# Patient Record
Sex: MALE | Race: WHITE | NOT HISPANIC OR LATINO | URBAN - METROPOLITAN AREA
[De-identification: names, ages, dates, MRNs, and addresses within clinical notes are randomized per-mention and may not be internally consistent; named-entity substitution may affect disease eponyms.]

---

## 2018-08-07 ENCOUNTER — RECORDS - HEALTHEAST (OUTPATIENT)
Dept: LAB | Facility: CLINIC | Age: 78
End: 2018-08-07

## 2018-08-07 LAB
ALBUMIN SERPL-MCNC: 3.6 G/DL (ref 3.5–5)
ALP SERPL-CCNC: 88 U/L (ref 45–120)
ALT SERPL W P-5'-P-CCNC: 18 U/L (ref 0–45)
ANION GAP SERPL CALCULATED.3IONS-SCNC: 8 MMOL/L (ref 5–18)
AST SERPL W P-5'-P-CCNC: 15 U/L (ref 0–40)
BILIRUB SERPL-MCNC: 0.3 MG/DL (ref 0–1)
BUN SERPL-MCNC: 29 MG/DL (ref 8–28)
CALCIUM SERPL-MCNC: 9.7 MG/DL (ref 8.5–10.5)
CHLORIDE BLD-SCNC: 106 MMOL/L (ref 98–107)
CHOLEST SERPL-MCNC: 172 MG/DL
CO2 SERPL-SCNC: 25 MMOL/L (ref 22–31)
CREAT SERPL-MCNC: 1.12 MG/DL (ref 0.7–1.3)
FASTING STATUS PATIENT QL REPORTED: NORMAL
GFR SERPL CREATININE-BSD FRML MDRD: >60 ML/MIN/1.73M2
GLUCOSE BLD-MCNC: 128 MG/DL (ref 70–125)
HDLC SERPL-MCNC: 73 MG/DL
LDLC SERPL CALC-MCNC: 82 MG/DL
POTASSIUM BLD-SCNC: 4.8 MMOL/L (ref 3.5–5)
PROT SERPL-MCNC: 6.6 G/DL (ref 6–8)
SODIUM SERPL-SCNC: 139 MMOL/L (ref 136–145)
TRIGL SERPL-MCNC: 83 MG/DL

## 2019-11-04 ENCOUNTER — RECORDS - HEALTHEAST (OUTPATIENT)
Dept: LAB | Facility: CLINIC | Age: 79
End: 2019-11-04

## 2019-11-04 LAB
ALBUMIN SERPL-MCNC: 3.1 G/DL (ref 3.5–5)
ALP SERPL-CCNC: 123 U/L (ref 45–120)
ALT SERPL W P-5'-P-CCNC: 19 U/L (ref 0–45)
ANION GAP SERPL CALCULATED.3IONS-SCNC: 7 MMOL/L (ref 5–18)
AST SERPL W P-5'-P-CCNC: 20 U/L (ref 0–40)
BILIRUB SERPL-MCNC: 0.3 MG/DL (ref 0–1)
BUN SERPL-MCNC: 25 MG/DL (ref 8–28)
CALCIUM SERPL-MCNC: 9.3 MG/DL (ref 8.5–10.5)
CHLORIDE BLD-SCNC: 107 MMOL/L (ref 98–107)
CO2 SERPL-SCNC: 27 MMOL/L (ref 22–31)
CREAT SERPL-MCNC: 1.05 MG/DL (ref 0.7–1.3)
GFR SERPL CREATININE-BSD FRML MDRD: >60 ML/MIN/1.73M2
GLUCOSE BLD-MCNC: 130 MG/DL (ref 70–125)
POTASSIUM BLD-SCNC: 4.6 MMOL/L (ref 3.5–5)
PROT SERPL-MCNC: 6 G/DL (ref 6–8)
SODIUM SERPL-SCNC: 141 MMOL/L (ref 136–145)

## 2019-11-05 LAB
CHOLEST SERPL-MCNC: 172 MG/DL
FASTING STATUS PATIENT QL REPORTED: NORMAL
HDLC SERPL-MCNC: 59 MG/DL
LDLC SERPL CALC-MCNC: 98 MG/DL
TRIGL SERPL-MCNC: 75 MG/DL

## 2019-12-18 ENCOUNTER — RECORDS - HEALTHEAST (OUTPATIENT)
Dept: LAB | Facility: CLINIC | Age: 79
End: 2019-12-18

## 2019-12-18 LAB
ANION GAP SERPL CALCULATED.3IONS-SCNC: 5 MMOL/L (ref 5–18)
BUN SERPL-MCNC: 28 MG/DL (ref 8–28)
CALCIUM SERPL-MCNC: 9.6 MG/DL (ref 8.5–10.5)
CHLORIDE BLD-SCNC: 105 MMOL/L (ref 98–107)
CO2 SERPL-SCNC: 29 MMOL/L (ref 22–31)
CREAT SERPL-MCNC: 1.32 MG/DL (ref 0.7–1.3)
GFR SERPL CREATININE-BSD FRML MDRD: 52 ML/MIN/1.73M2
GLUCOSE BLD-MCNC: 135 MG/DL (ref 70–125)
POTASSIUM BLD-SCNC: 4.5 MMOL/L (ref 3.5–5)
SODIUM SERPL-SCNC: 139 MMOL/L (ref 136–145)

## 2020-04-22 ENCOUNTER — RECORDS - HEALTHEAST (OUTPATIENT)
Dept: LAB | Facility: CLINIC | Age: 80
End: 2020-04-22

## 2020-04-22 LAB
ALBUMIN SERPL-MCNC: 3.6 G/DL (ref 3.5–5)
ALP SERPL-CCNC: 93 U/L (ref 45–120)
ALT SERPL W P-5'-P-CCNC: 10 U/L (ref 0–45)
ANION GAP SERPL CALCULATED.3IONS-SCNC: 10 MMOL/L (ref 5–18)
AST SERPL W P-5'-P-CCNC: 15 U/L (ref 0–40)
BILIRUB SERPL-MCNC: 0.4 MG/DL (ref 0–1)
BUN SERPL-MCNC: 20 MG/DL (ref 8–28)
CALCIUM SERPL-MCNC: 9.5 MG/DL (ref 8.5–10.5)
CHLORIDE BLD-SCNC: 104 MMOL/L (ref 98–107)
CO2 SERPL-SCNC: 24 MMOL/L (ref 22–31)
CREAT SERPL-MCNC: 1.19 MG/DL (ref 0.7–1.3)
GFR SERPL CREATININE-BSD FRML MDRD: 59 ML/MIN/1.73M2
GLUCOSE BLD-MCNC: 125 MG/DL (ref 70–125)
POTASSIUM BLD-SCNC: 4.6 MMOL/L (ref 3.5–5)
PROT SERPL-MCNC: 6.8 G/DL (ref 6–8)
SODIUM SERPL-SCNC: 138 MMOL/L (ref 136–145)

## 2020-05-13 ENCOUNTER — RECORDS - HEALTHEAST (OUTPATIENT)
Dept: LAB | Facility: CLINIC | Age: 80
End: 2020-05-13

## 2020-05-13 LAB
ALBUMIN SERPL-MCNC: 3.7 G/DL (ref 3.5–5)
ALP SERPL-CCNC: 102 U/L (ref 45–120)
ALT SERPL W P-5'-P-CCNC: 18 U/L (ref 0–45)
ANION GAP SERPL CALCULATED.3IONS-SCNC: 8 MMOL/L (ref 5–18)
AST SERPL W P-5'-P-CCNC: 14 U/L (ref 0–40)
BILIRUB SERPL-MCNC: 0.2 MG/DL (ref 0–1)
BUN SERPL-MCNC: 36 MG/DL (ref 8–28)
CALCIUM SERPL-MCNC: 9.7 MG/DL (ref 8.5–10.5)
CHLORIDE BLD-SCNC: 107 MMOL/L (ref 98–107)
CO2 SERPL-SCNC: 24 MMOL/L (ref 22–31)
CREAT SERPL-MCNC: 1.5 MG/DL (ref 0.7–1.3)
GFR SERPL CREATININE-BSD FRML MDRD: 45 ML/MIN/1.73M2
GLUCOSE BLD-MCNC: 106 MG/DL (ref 70–125)
POTASSIUM BLD-SCNC: 5.7 MMOL/L (ref 3.5–5)
PROT SERPL-MCNC: 7.2 G/DL (ref 6–8)
SODIUM SERPL-SCNC: 139 MMOL/L (ref 136–145)

## 2020-05-20 ENCOUNTER — RECORDS - HEALTHEAST (OUTPATIENT)
Dept: LAB | Facility: CLINIC | Age: 80
End: 2020-05-20

## 2020-05-20 LAB
ALBUMIN SERPL-MCNC: 4 G/DL (ref 3.5–5)
ALP SERPL-CCNC: 98 U/L (ref 45–120)
ALT SERPL W P-5'-P-CCNC: 11 U/L (ref 0–45)
ANION GAP SERPL CALCULATED.3IONS-SCNC: 13 MMOL/L (ref 5–18)
AST SERPL W P-5'-P-CCNC: 14 U/L (ref 0–40)
BILIRUB SERPL-MCNC: 0.4 MG/DL (ref 0–1)
BUN SERPL-MCNC: 38 MG/DL (ref 8–28)
CALCIUM SERPL-MCNC: 10 MG/DL (ref 8.5–10.5)
CHLORIDE BLD-SCNC: 103 MMOL/L (ref 98–107)
CO2 SERPL-SCNC: 24 MMOL/L (ref 22–31)
CREAT SERPL-MCNC: 1.5 MG/DL (ref 0.7–1.3)
GFR SERPL CREATININE-BSD FRML MDRD: 45 ML/MIN/1.73M2
GLUCOSE BLD-MCNC: 154 MG/DL (ref 70–125)
POTASSIUM BLD-SCNC: 4.7 MMOL/L (ref 3.5–5)
PROT SERPL-MCNC: 7.9 G/DL (ref 6–8)
SODIUM SERPL-SCNC: 140 MMOL/L (ref 136–145)

## 2020-09-03 ENCOUNTER — COMMUNICATION - HEALTHEAST (OUTPATIENT)
Dept: SCHEDULING | Facility: CLINIC | Age: 80
End: 2020-09-03

## 2020-09-09 ENCOUNTER — OFFICE VISIT - HEALTHEAST (OUTPATIENT)
Dept: GERIATRICS | Facility: CLINIC | Age: 80
End: 2020-09-09

## 2020-09-09 DIAGNOSIS — I10 ESSENTIAL HYPERTENSION: ICD-10-CM

## 2020-09-09 DIAGNOSIS — G93.40 ACUTE ENCEPHALOPATHY: ICD-10-CM

## 2020-09-09 DIAGNOSIS — M25.559 HIP PAIN: ICD-10-CM

## 2020-09-09 DIAGNOSIS — F03.90 DEMENTIA WITHOUT BEHAVIORAL DISTURBANCE, UNSPECIFIED DEMENTIA TYPE: ICD-10-CM
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
## 2020-09-09 DIAGNOSIS — Z86.73 HISTORY OF STROKE: ICD-10-CM

## 2020-09-09 DIAGNOSIS — N40.1 BENIGN PROSTATIC HYPERPLASIA WITH LOWER URINARY TRACT SYMPTOMS, SYMPTOM DETAILS UNSPECIFIED: ICD-10-CM

## 2020-09-09 DIAGNOSIS — R33.9 URINARY RETENTION: ICD-10-CM

## 2020-09-11 ENCOUNTER — OFFICE VISIT - HEALTHEAST (OUTPATIENT)
Dept: GERIATRICS | Facility: CLINIC | Age: 80
End: 2020-09-11

## 2020-09-11 DIAGNOSIS — R46.89 COGNITIVE AND BEHAVIORAL CHANGES: ICD-10-CM

## 2020-09-11 DIAGNOSIS — R41.89 COGNITIVE AND BEHAVIORAL CHANGES: ICD-10-CM

## 2020-09-11 DIAGNOSIS — M25.559 HIP PAIN: ICD-10-CM
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
## 2020-09-11 DIAGNOSIS — I10 HYPERTENSION, UNSPECIFIED TYPE: ICD-10-CM

## 2020-09-14 ENCOUNTER — OFFICE VISIT - HEALTHEAST (OUTPATIENT)
Dept: GERIATRICS | Facility: CLINIC | Age: 80
End: 2020-09-14

## 2020-09-14 DIAGNOSIS — G47.00 INSOMNIA, UNSPECIFIED TYPE: ICD-10-CM

## 2020-09-14 DIAGNOSIS — M25.559 HIP PAIN: ICD-10-CM

## 2020-09-14 DIAGNOSIS — I10 HYPERTENSION, UNSPECIFIED TYPE: ICD-10-CM
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
## 2020-09-16 ENCOUNTER — AMBULATORY - HEALTHEAST (OUTPATIENT)
Dept: GERIATRICS | Facility: CLINIC | Age: 80
End: 2020-09-16

## 2020-12-19 ENCOUNTER — COMMUNICATION - HEALTHEAST (OUTPATIENT)
Dept: SCHEDULING | Facility: CLINIC | Age: 80
End: 2020-12-19

## 2021-01-06 ENCOUNTER — RECORDS - HEALTHEAST (OUTPATIENT)
Dept: LAB | Facility: CLINIC | Age: 81
End: 2021-01-06

## 2021-01-07 LAB — BACTERIA SPEC CULT: NO GROWTH

## 2021-04-21 ENCOUNTER — RECORDS - HEALTHEAST (OUTPATIENT)
Dept: LAB | Facility: CLINIC | Age: 81
End: 2021-04-21

## 2021-04-23 LAB — BACTERIA SPEC CULT: ABNORMAL

## 2021-05-13 ENCOUNTER — COMMUNICATION - HEALTHEAST (OUTPATIENT)
Dept: SCHEDULING | Facility: CLINIC | Age: 81
End: 2021-05-13

## 2021-05-27 VITALS
HEART RATE: 69 BPM | SYSTOLIC BLOOD PRESSURE: 151 MMHG | DIASTOLIC BLOOD PRESSURE: 69 MMHG | OXYGEN SATURATION: 96 % | TEMPERATURE: 97.1 F | RESPIRATION RATE: 14 BRPM

## 2021-06-02 ENCOUNTER — TRANSCRIBE ORDERS (OUTPATIENT)
Dept: OTHER | Age: 81
End: 2021-06-02

## 2021-06-02 DIAGNOSIS — F03.918 DEMENTIA WITH BEHAVIORAL DISTURBANCE (H): Primary | ICD-10-CM

## 2021-06-11 NOTE — PROGRESS NOTES
[facility]    Facility:   [facility] [[id number]]   Code Status: DNR      CHIEF COMPLAINT/REASON FOR VISIT:  Chief Complaint   Patient presents with     Problem Visit     [facility] fall, rehab, hip, htn, bph,        HISTORY:      HPI: Dony is a 80 y.o. male who was hospitalized September 1, 2020 through September 8, 2020 secondary to a fall.  Right hip x-rays did show anatomic alignment of the right hip and no acute displaced fracture.  He does have mild bilateral hip osteoarthritis along with degenerative changes at the symphysis pubis and symmetric degenerative SI joints.  The CT of the pelvis did not show any evidence of avascular necrosis and also again confirms that there are degenerative changes in both hips.  He was diagnosed with right and left hip pain but negative for fractures and was given oxycodone and Tylenol.  They did feel it was a mechanical fall.  EKG was negative for any ischemic changes and the troponins x3 were negative.  He also has a history of hypertension and diabetes along with cognitive impairment and BPH.  Initially he did have a Auguste catheter.  He is now in the transitional care unit and due to cognition issues he has had occasional behavioral problems as well as forgetfulness but at times he has been redirectable.  In talking with the  they are looking at a potential for discharge the next couple days maybe even perhaps by the weekend.  He has been normotensive and afebrile and also on room air.  For pain he does take the oxycodone as needed and he does take about 2-3 doses per day and takes between 1 or 2 Tylenol as needed per day.  Blood sugars have been checked due to history of diabetes but currently not being treated.  Sugars ranging 123-130.    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             Family History   Problem Relation Age of Onset     Ovarian cancer Mother      No Medical Problems Father      Stroke Sister      Social History     Socioeconomic History     Marital status:      Spouse name: Not on file     Number of children: Not on file     Years of education: Not on file     Highest education level: Not on file   Occupational History     Not on file   Social Needs     Financial resource strain: Not on file     Food insecurity     Worry: Not on file     Inability: Not on file     Transportation needs     Medical: Not on file     Non-medical: Not on file   Tobacco Use     Smoking status: Current Every Day Smoker     Packs/day: 0.25     Years: 70.00     Pack years: 17.50     Types: Cigarettes     Smokeless tobacco: Never Used   Substance and Sexual Activity     Alcohol use: Not Currently     Drug use: Yes     Types: Marijuana     Comment: 1 ounce/ 3 weeks     Sexual activity: Not Currently   Lifestyle     Physical activity     Days per week: Not on file     Minutes per session: Not on file     Stress: Not on file   Relationships     Social connections     Talks on phone: Not on file     Gets together: Not on file     Attends Nondenominational service: Not on file     Active member of club or organization: Not on file     Attends meetings of clubs or organizations: Not on file     Relationship status: Not on file     Intimate partner violence     Fear of current or ex partner: Not on file     Emotionally abused: Not on file     Physically abused: Not on file     Forced sexual activity: Not on file   Other Topics Concern     Not on file   Social History Narrative    Patient does not use any assistive device for ambulation. Patient is not on any supplemental O2 at home. Patient's wife jared is HCP.          Review of Systems  There have been no reports of fever chills fatigue cough or cold or flulike symptoms nausea vomiting diarrhea dysuria headache  stiff neck appetite changes or unusual myalgias.  There were no vitals filed for this visit.  Blood pressure 109/53, pulse 63, respirations 20, temperature 98.3, saturation room air 96%  Physical Exam  Head is normocephalic.  Neck is supple without adenopathy.  Lung sounds are clear throughout.  Cardiovascular S1-S2 regular rhythm.  No lower extremity edema.  Gastrointestinal nontender and nondistended.  Musculoskeletal denies any hip.  Psychiatric: Flat affect.  LABS:   Lab Results   Component Value Date    HGBA1C 6.4 (H) 09/01/2020     Lab Results   Component Value Date    WBC 7.8 09/04/2020    HGB 11.9 (L) 09/04/2020    HCT 35.1 (L) 09/04/2020    MCV 97 09/04/2020     09/04/2020     Results for orders placed or performed during the hospital encounter of 09/01/20   Basic Metabolic Panel   Result Value Ref Range    Sodium 139 136 - 145 mmol/L    Potassium 4.2 3.5 - 5.0 mmol/L    Chloride 107 98 - 107 mmol/L    CO2 25 22 - 31 mmol/L    Anion Gap, Calculation 7 5 - 18 mmol/L    Glucose 112 70 - 125 mg/dL    Calcium 9.4 8.5 - 10.5 mg/dL    BUN 26 8 - 28 mg/dL    Creatinine 0.98 0.70 - 1.30 mg/dL    GFR MDRD Af Amer >60 >60 mL/min/1.73m2    GFR MDRD Non Af Amer >60 >60 mL/min/1.73m2       Lab Results   Component Value Date    ALT 17 09/01/2020    AST 15 09/01/2020    ALKPHOS 81 09/01/2020    BILITOT 0.5 09/01/2020     .  Lab Results   Component Value Date    ALBUMIN 2.8 (L) 09/01/2020         ASSESSMENT:      ICD-10-CM    1. Accident due to mechanical fall without injury, subsequent encounter  W19.XXXD    2. Hip pain  M25.559    3. Hypertension, unspecified type  I10    4. Cognitive and behavioral changes  R41.89     R46.89        PLAN:    Continue to manage his chronic medical conditions.  It appears that he will potentially have a discharge in the next day or 2 but that has not been formally determined.  If he does get discharged we will send him home with current medications.  Otherwise continue to work with  him in therapy and continue to manage and follow.    For documentation purposes total visit 35 minutes to which over 50% was spent with the patient to establish care along with his medications and his hospitalization and his concerns as well as coordination of care with other staff members and .      Electronically signed by: Michael Duane Johnson, CNP

## 2021-06-11 NOTE — PROGRESS NOTES
[facility]    Facility[facility]   [facility] [[id number]]   Code Status: DNR      CHIEF COMPLAINT/REASON FOR VISIT:  Chief Complaint   Patient presents with     Problem Visit     hip pain, hayes,        HISTORY:      HPI: Dony is a 80 y.o. male who is in the transitional care unit and was hospitalized September 1, 2020 through September 8, 2020 secondary to a fall and did sustain right hip pain but does have osteoarthrosis through his major joints but no fractures.  He also has a Hayes catheter.  Has been enrolled in the therapy process and has occasionally participated.  He has been normotensive and afebrile and also on room air.  For pain he can take Tylenol as needed usually about 1 dose per day also does have oxycodone as needed usually.  He is on a level 4 diet.  I did have a chance to talk to the  about his desire to want to go home.      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             Family History   Problem Relation Age of Onset     Ovarian cancer Mother      No Medical Problems Father      Stroke Sister      Social History     Socioeconomic History     Marital status:      Spouse name: Not on file     Number of children: Not on file     Years of education: Not on file     Highest education level: Not on file   Occupational History     Not on file   Social Needs     Financial resource strain: Not on file     Food insecurity     Worry: Not on file     Inability: Not on file     Transportation needs     Medical: Not on file     Non-medical: Not on file   Tobacco Use     Smoking status: Current Every Day Smoker     Packs/day: 0.25     Years: 70.00     Pack years: 17.50     Types: Cigarettes     Smokeless tobacco: Never Used   Substance and Sexual Activity     Alcohol use: Not  Currently     Drug use: Yes     Types: Marijuana     Comment: 1 ounce/ 3 weeks     Sexual activity: Not Currently   Lifestyle     Physical activity     Days per week: Not on file     Minutes per session: Not on file     Stress: Not on file   Relationships     Social connections     Talks on phone: Not on file     Gets together: Not on file     Attends Advent service: Not on file     Active member of club or organization: Not on file     Attends meetings of clubs or organizations: Not on file     Relationship status: Not on file     Intimate partner violence     Fear of current or ex partner: Not on file     Emotionally abused: Not on file     Physically abused: Not on file     Forced sexual activity: Not on file   Other Topics Concern     Not on file   Social History Narrative    Patient does not use any assistive device for ambulation. Patient is not on any supplemental O2 at home. Patient's wife jared is HCP.          Review of Systems  There have been no reports of fever chills fatigue cough or cold or flulike symptoms nausea vomiting diarrhea dysuria headache stiff neck appetite changes or unusual myalgias.    Current Outpatient Medications   Medication Sig Note     acetaminophen (TYLENOL) 500 MG tablet Take 1 tablet (500 mg total) by mouth every 4 (four) hours as needed for pain.      amitriptyline (ELAVIL) 25 MG tablet Take 3 tablets (75 mg total) by mouth at bedtime.      divalproex (DEPAKOTE SPRINKLE) 125 mg capsule Take 1 capsule (125 mg total) by mouth at bedtime.      divalproex (DEPAKOTE SPRINKLE) 125 mg capsule Take 1 capsule (125 mg total) by mouth daily before supper. 9/1/2020: Based on conversation with patient & [facility] medical records, suspect patient is supposed to be taking Depakote two times a day at supper and bedtime, but is only taking 1 capsule (125 mg) nightly at bedtime.     finasteride (PROSCAR) 5 mg tablet Take 5 mg by mouth at bedtime.       lisinopriL (PRINIVIL,ZESTRIL) 20 MG  tablet Take 20 mg by mouth at bedtime.       nebivoloL 20 mg Tab Take 20 mg by mouth at bedtime.       oxyCODONE (ROXICODONE) 5 MG immediate release tablet Take 0.5 tablets (2.5 mg total) by mouth every 8 (eight) hours as needed.      polyethylene glycol (MIRALAX) 17 gram packet Take 1 packet (17 g total) by mouth daily. Hold if loose stool or more than 2 BMs per day      terazosin (HYTRIN) 5 MG capsule Take 5 mg by mouth at bedtime.        There were no vitals filed for this visit.  Blood pressure 131/72, pulse 62, respirations 16, temperature 97.8  Physical Exam   Head is normocephalic.   Lung sounds are clear throughout.  Cardiovascular S1-S2 regular rhythm.  No lower extremity edema.  Gastrointestinal nontender and nondistended.  Musculoskeletal denies any hip.  Psychiatric: Flat affect.   Auguste catheter  LABS:   Lab Results   Component Value Date    WBC 7.8 09/04/2020    HGB 11.9 (L) 09/04/2020    HCT 35.1 (L) 09/04/2020    MCV 97 09/04/2020     09/04/2020     Results for orders placed or performed during the hospital encounter of 09/01/20   Basic Metabolic Panel   Result Value Ref Range    Sodium 139 136 - 145 mmol/L    Potassium 4.2 3.5 - 5.0 mmol/L    Chloride 107 98 - 107 mmol/L    CO2 25 22 - 31 mmol/L    Anion Gap, Calculation 7 5 - 18 mmol/L    Glucose 112 70 - 125 mg/dL    Calcium 9.4 8.5 - 10.5 mg/dL    BUN 26 8 - 28 mg/dL    Creatinine 0.98 0.70 - 1.30 mg/dL    GFR MDRD Af Amer >60 >60 mL/min/1.73m2    GFR MDRD Non Af Amer >60 >60 mL/min/1.73m2       Lab Results   Component Value Date    HGBA1C 6.4 (H) 09/01/2020         ASSESSMENT:      ICD-10-CM    1. Hip pain  M25.559    2. Fall, subsequent encounter  W19.XXXD    3. Hypertension, unspecified type  I10    4. Insomnia, unspecified type  G47.00        PLAN:    At this point did have a meeting with the  regarding his desire to be discharged to home I do not know all the legitimate etiologies or the concerns with that suggestion but  the  will check into it today.  I will is no other new medication changes with this visit.        Electronically signed by: Michael Duane Johnson, CNP

## 2021-06-11 NOTE — PROGRESS NOTES
[facility]      Facility:    [facility] [[id number]]  Code Status: DNR      Chief Complaint/Reason for Visit:  Chief Complaint   Patient presents with     H & P     Fall, hip pain, stroke in the past, type 2 diabetes, dementia, insomnia.       HPI:   Dony is a 80 y.o. male who has a past medical history of dimension was then admitted to the hospital on 9/1/2020 for fall he sustained at home and he did have bilateral hip pain.  He was brought in the hospital was found to have acute encephalopathy likely related to opioid use and that was adjusted.  He does have bilateral hip pain and CT scan was done and was negative for any acute pathology.  There was degenerative changes which is chronic at this time.  His encephalopathy did improve and he does have hypertension history of stroke blood pressure was stable continues to be stable he continued his nebivolol as well as lisinopril.  He does have type 2 diabetes but hemoglobin A1c was 6.4 and blood sugars are well controlled.    He does have benign prostatic hypertrophy with urinary tension did have a Auguste catheter placed.  And he is likely due for a voiding trial at this time.  He is on home Proscar and Hytrin.  He was treated properly and transferred here to the TCU at [facility] in stable condition.    Patient has no new concerns today and denies any other problems he says he is got a Auguste cath and I would like it out and will do a voiding trial this week.  He denies any chest pain shortness of breath fevers chills nausea vomiting claims his pain is under adequate control at this time.  There is obvious dementia involved and I do not know what his baseline is but he will undergo occupational and physical therapy here.    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           Surgical History:  Past Surgical History:   Procedure Laterality Date     APPENDECTOMY       HERNIA REPAIR         Family History:   Family History   Problem Relation Age of Onset     Ovarian cancer Mother      No Medical Problems Father      Stroke Sister        Social History:    Social History     Socioeconomic History     Marital status:      Spouse name: None     Number of children: None     Years of education: None     Highest education level: None   Occupational History     None   Social Needs     Financial resource strain: None     Food insecurity     Worry: None     Inability: None     Transportation needs     Medical: None     Non-medical: None   Tobacco Use     Smoking status: Current Every Day Smoker     Packs/day: 0.25     Years: 70.00     Pack years: 17.50     Types: Cigarettes     Smokeless tobacco: Never Used   Substance and Sexual Activity     Alcohol use: Not Currently     Drug use: Yes     Types: Marijuana     Comment: 1 ounce/ 3 weeks     Sexual activity: Not Currently   Lifestyle     Physical activity     Days per week: None     Minutes per session: None     Stress: None   Relationships     Social connections     Talks on phone: None     Gets together: None     Attends Gnosticism service: None     Active member of club or organization: None     Attends meetings of clubs or organizations: None     Relationship status: None     Intimate partner violence     Fear of current or ex partner: None     Emotionally abused: None     Physically abused: None     Forced sexual activity: None   Other Topics Concern     None   Social History Narrative    Patient does not use any assistive device for ambulation. Patient is not on any supplemental O2 at home. Patient's wife jared is HCP.           Review of Systems   Constitutional:        Patient denies any pain fevers chills nausea vomit diarrhea change in vision hearing taste or smell  weakness 1 side of the chest pain shortness of breath.  Denies any current shortness stool polyphagia polydipsia polyuria depression or anxiety in the remainder the review of systems is negative.       Vitals:    09/09/20 0943   BP: 151/69   Pulse: 69   Resp: 14   Temp: 97.1  F (36.2  C)   SpO2: 96%       Physical Exam  Constitutional:       General: He is not in acute distress.  HENT:      Head: Atraumatic.      Mouth/Throat:      Mouth: Mucous membranes are moist.      Pharynx: Oropharynx is clear.   Eyes:      General: No scleral icterus.        Right eye: No discharge.         Left eye: No discharge.      Conjunctiva/sclera: Conjunctivae normal.   Cardiovascular:      Rate and Rhythm: Normal rate and regular rhythm.   Pulmonary:      Effort: Pulmonary effort is normal. No respiratory distress.   Abdominal:      General: There is no distension.      Tenderness: There is no abdominal tenderness. There is no guarding.   Skin:     General: Skin is warm and dry.   Neurological:      Mental Status: Mental status is at baseline.   Psychiatric:         Mood and Affect: Mood normal.         Behavior: Behavior normal.         Medication List:  Current Outpatient Medications   Medication Sig     acetaminophen (TYLENOL) 500 MG tablet Take 1 tablet (500 mg total) by mouth every 4 (four) hours as needed for pain.     amitriptyline (ELAVIL) 25 MG tablet Take 3 tablets (75 mg total) by mouth at bedtime.     divalproex (DEPAKOTE SPRINKLE) 125 mg capsule Take 1 capsule (125 mg total) by mouth at bedtime.     divalproex (DEPAKOTE SPRINKLE) 125 mg capsule Take 1 capsule (125 mg total) by mouth daily before supper.     finasteride (PROSCAR) 5 mg tablet Take 5 mg by mouth at bedtime.      lisinopriL (PRINIVIL,ZESTRIL) 20 MG tablet Take 20 mg by mouth at bedtime.      nebivoloL 20 mg Tab Take 20 mg by mouth at bedtime.      oxyCODONE (ROXICODONE) 5 MG immediate release tablet Take 0.5 tablets (2.5 mg total) by mouth every 8 (eight)  hours as needed.     polyethylene glycol (MIRALAX) 17 gram packet Take 1 packet (17 g total) by mouth daily. Hold if loose stool or more than 2 BMs per day     terazosin (HYTRIN) 5 MG capsule Take 5 mg by mouth at bedtime.       Labs: Reviewed.      Assessment:    ICD-10-CM    1. Hip pain  M25.559    2. Fall, subsequent encounter  W19.XXXD    3. Acute encephalopathy  G93.40    4. History of stroke  Z86.73    5. Essential hypertension  I10    6. Dementia without behavioral disturbance, unspecified dementia type (H)  F03.90    7. Urinary retention  R33.9    8. Benign prostatic hyperplasia with lower urinary tract symptoms, symptom details unspecified  N40.1        Plan: Plan at this time we will continue with physical and occupational therapy at this time and we will do a voiding trial with him with removing her Auguste catheter likely tomorrow.  We will check postvoid residual x3 days and straight cath if greater than 250 cc.  He is not encephalopathic at this time and his strength is symmetrical bilateral.  I will continue to monitor above medical problems and no other changes to care plan at this time.        Electronically signed by: Rmoeo Ovalle DO

## 2021-06-16 PROBLEM — E78.5 HYPERLIPIDEMIA: Status: ACTIVE | Noted: 2020-01-19

## 2021-06-16 PROBLEM — Z72.0 TOBACCO ABUSE: Status: ACTIVE | Noted: 2020-01-19
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
## 2021-06-16 PROBLEM — D72.829 LEUKOCYTOSIS: Status: ACTIVE | Noted: 2019-10-24

## 2021-06-16 PROBLEM — M25.559 HIP PAIN: Status: ACTIVE | Noted: 2020-09-01

## 2021-06-16 PROBLEM — R33.9 URINARY RETENTION: Status: ACTIVE | Noted: 2019-10-24
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
## 2021-06-16 PROBLEM — N17.9 AKI (ACUTE KIDNEY INJURY) (H): Status: ACTIVE | Noted: 2021-05-14

## 2021-06-16 PROBLEM — I10 HYPERTENSION: Status: ACTIVE | Noted: 2020-01-19
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
## 2021-06-16 PROBLEM — T83.511A: Status: ACTIVE | Noted: 2020-12-18

## 2021-06-16 PROBLEM — L21.9 SEBORRHEIC DERMATITIS OF SCALP: Status: ACTIVE | Noted: 2019-10-24

## 2021-06-16 PROBLEM — S72.101D CLOSED FRACTURE OF TROCHANTER OF RIGHT FEMUR WITH ROUTINE HEALING: Status: ACTIVE | Noted: 2021-05-14

## 2021-06-16 PROBLEM — I69.90 LATE EFFECTS OF CEREBROVASCULAR DISEASE: Status: ACTIVE | Noted: 2020-01-19

## 2021-06-16 PROBLEM — R41.82 AMS (ALTERED MENTAL STATUS): Status: ACTIVE | Noted: 2019-10-24

## 2021-06-16 PROBLEM — N50.1 PELVIC HEMATOMA, MALE: Status: ACTIVE | Noted: 2021-05-16

## 2021-06-16 PROBLEM — I16.0 HYPERTENSIVE URGENCY: Status: ACTIVE | Noted: 2019-10-24

## 2021-06-16 PROBLEM — S32.599A INFERIOR PUBIC RAMUS FRACTURE (H): Status: ACTIVE | Noted: 2021-05-14

## 2021-06-16 PROBLEM — S32.474D: Status: ACTIVE | Noted: 2021-05-14

## 2021-06-16 PROBLEM — N39.0 UTI (URINARY TRACT INFECTION): Status: ACTIVE | Noted: 2020-12-17

## 2021-06-16 PROBLEM — T83.9XXA FOLEY CATHETER PROBLEM, INITIAL ENCOUNTER (H): Status: ACTIVE | Noted: 2020-12-17

## 2021-06-16 PROBLEM — E11.9 DIABETES MELLITUS WITHOUT COMPLICATION (H): Status: ACTIVE | Noted: 2020-01-19

## 2021-06-20 NOTE — LETTER
Letter by Romeo Ovalle DO at      Author: Romeo Ovalle DO Service: -- Author Type: --    Filed:  Encounter Date: 9/9/2020 Status: (Other)         Patient: Dony Huddleston   MR Number: [id number]   YOB: 1940   Date of Visit: 9/9/2020     [facility]      Facility:    [facility] [[id number]]  Code Status: DNR      Chief Complaint/Reason for Visit:  Chief Complaint   Patient presents with   ? H & P     Fall, hip pain, stroke in the past, type 2 diabetes, dementia, insomnia.       HPI:   Dony is a 80 y.o. male who has a past medical history of dimension was then admitted to the hospital on 9/1/2020 for fall he sustained at home and he did have bilateral hip pain.  He was brought in the hospital was found to have acute encephalopathy likely related to opioid use and that was adjusted.  He does have bilateral hip pain and CT scan was done and was negative for any acute pathology.  There was degenerative changes which is chronic at this time.  His encephalopathy did improve and he does have hypertension history of stroke blood pressure was stable continues to be stable he continued his nebivolol as well as lisinopril.  He does have type 2 diabetes but hemoglobin A1c was 6.4 and blood sugars are well controlled.    He does have benign prostatic hypertrophy with urinary tension did have a Auguste catheter placed.  And he is likely due for a voiding trial at this time.  He is on home Proscar and Hytrin.  He was treated properly and transferred here to the TCU at [facility] in stable condition.    Patient has no new concerns today and denies any other problems he says he is got a Auguste cath and I would like it out and will do a voiding trial this week.  He denies any chest pain shortness of breath fevers chills nausea vomiting claims his pain is under adequate control at this time.  There is obvious dementia involved and I do not know what his  baseline is but he will undergo occupational and physical therapy here.    Past Medical History:  Past Medical History:   Diagnosis Date   ? Dementia (H)    ? Diabetes mellitus, type II (H)    ? Hyperlipidemia    ? Hypertension    ? Insomnia    ? Osteoarthritis    ? Stroke (H)     lost taste in left side of mouth           Surgical History:  Past Surgical History:   Procedure Laterality Date   ? APPENDECTOMY     ? HERNIA REPAIR         Family History:   Family History   Problem Relation Age of Onset   ? Ovarian cancer Mother    ? No Medical Problems Father    ? Stroke Sister        Social History:    Social History     Socioeconomic History   ? Marital status:      Spouse name: None   ? Number of children: None   ? Years of education: None   ? Highest education level: None   Occupational History   ? None   Social Needs   ? Financial resource strain: None   ? Food insecurity     Worry: None     Inability: None   ? Transportation needs     Medical: None     Non-medical: None   Tobacco Use   ? Smoking status: Current Every Day Smoker     Packs/day: 0.25     Years: 70.00     Pack years: 17.50     Types: Cigarettes   ? Smokeless tobacco: Never Used   Substance and Sexual Activity   ? Alcohol use: Not Currently   ? Drug use: Yes     Types: Marijuana     Comment: 1 ounce/ 3 weeks   ? Sexual activity: Not Currently   Lifestyle   ? Physical activity     Days per week: None     Minutes per session: None   ? Stress: None   Relationships   ? Social connections     Talks on phone: None     Gets together: None     Attends L[employer] service: None     Active member of club or organization: None     Attends meetings of clubs or organizations: None     Relationship status: None   ? Intimate partner violence     Fear of current or ex partner: None     Emotionally abused: None     Physically abused: None     Forced sexual activity: None   Other Topics Concern   ? None   Social History Narrative    Patient does not use any  assistive device for ambulation. Patient is not on any supplemental O2 at home. Patient's wife jared is HCP.           Review of Systems   Constitutional:        Patient denies any pain fevers chills nausea vomit diarrhea change in vision hearing taste or smell weakness 1 side of the chest pain shortness of breath.  Denies any current shortness stool polyphagia polydipsia polyuria depression or anxiety in the remainder the review of systems is negative.       Vitals:    09/09/20 0943   BP: 151/69   Pulse: 69   Resp: 14   Temp: 97.1  F (36.2  C)   SpO2: 96%       Physical Exam  Constitutional:       General: He is not in acute distress.  HENT:      Head: Atraumatic.      Mouth/Throat:      Mouth: Mucous membranes are moist.      Pharynx: Oropharynx is clear.   Eyes:      General: No scleral icterus.        Right eye: No discharge.         Left eye: No discharge.      Conjunctiva/sclera: Conjunctivae normal.   Cardiovascular:      Rate and Rhythm: Normal rate and regular rhythm.   Pulmonary:      Effort: Pulmonary effort is normal. No respiratory distress.   Abdominal:      General: There is no distension.      Tenderness: There is no abdominal tenderness. There is no guarding.   Skin:     General: Skin is warm and dry.   Neurological:      Mental Status: Mental status is at baseline.   Psychiatric:         Mood and Affect: Mood normal.         Behavior: Behavior normal.         Medication List:  Current Outpatient Medications   Medication Sig   ? acetaminophen (TYLENOL) 500 MG tablet Take 1 tablet (500 mg total) by mouth every 4 (four) hours as needed for pain.   ? amitriptyline (ELAVIL) 25 MG tablet Take 3 tablets (75 mg total) by mouth at bedtime.   ? divalproex (DEPAKOTE SPRINKLE) 125 mg capsule Take 1 capsule (125 mg total) by mouth at bedtime.   ? divalproex (DEPAKOTE SPRINKLE) 125 mg capsule Take 1 capsule (125 mg total) by mouth daily before supper.   ? finasteride (PROSCAR) 5 mg tablet Take 5 mg by mouth  at bedtime.    ? lisinopriL (PRINIVIL,ZESTRIL) 20 MG tablet Take 20 mg by mouth at bedtime.    ? nebivoloL 20 mg Tab Take 20 mg by mouth at bedtime.    ? oxyCODONE (ROXICODONE) 5 MG immediate release tablet Take 0.5 tablets (2.5 mg total) by mouth every 8 (eight) hours as needed.   ? polyethylene glycol (MIRALAX) 17 gram packet Take 1 packet (17 g total) by mouth daily. Hold if loose stool or more than 2 BMs per day   ? terazosin (HYTRIN) 5 MG capsule Take 5 mg by mouth at bedtime.       Labs: Reviewed.      Assessment:    ICD-10-CM    1. Hip pain  M25.559    2. Fall, subsequent encounter  W19.XXXD    3. Acute encephalopathy  G93.40    4. History of stroke  Z86.73    5. Essential hypertension  I10    6. Dementia without behavioral disturbance, unspecified dementia type (H)  F03.90    7. Urinary retention  R33.9    8. Benign prostatic hyperplasia with lower urinary tract symptoms, symptom details unspecified  N40.1        Plan: Plan at this time we will continue with physical and occupational therapy at this time and we will do a voiding trial with him with removing her Auguste catheter likely tomorrow.  We will check postvoid residual x3 days and straight cath if greater than 250 cc.  He is not encephalopathic at this time and his strength is symmetrical bilateral.  I will continue to monitor above medical problems and no other changes to care plan at this time.        Electronically signed by: Romeo Ovalle, DO

## 2021-06-20 NOTE — LETTER
Letter by Johnson, Michael Duane, CNP at      Author: Johnson, Michael Duane, CNP Service: -- Author Type: --    Filed:  Encounter Date: 9/14/2020 Status: (Other)         Patient: Dony Huddleston   MR Number: [id number]   YOB: 1940   Date of Visit: 9/14/2020     [facility]    Facility:   [facility] [[id number]]   Code Status: DNR      CHIEF COMPLAINT/REASON FOR VISIT:  Chief Complaint   Patient presents with   ? Problem Visit     hip pain, hayes,        HISTORY:      HPI: Dony is a 80 y.o. male who is in the transitional care unit and was hospitalized September 1, 2020 through September 8, 2020 secondary to a fall and did sustain right hip pain but does have osteoarthrosis through his major joints but no fractures.  He also has a Hayes catheter.  Has been enrolled in the therapy process and has occasionally participated.  He has been normotensive and afebrile and also on room air.  For pain he can take Tylenol as needed usually about 1 dose per day also does have oxycodone as needed usually.  He is on a level 4 diet.  I did have a chance to talk to the  about his desire to want to go home.      Past Medical History:   Diagnosis Date   ? Dementia (H)    ? Diabetes mellitus, type II (H)    ? Hyperlipidemia    ? Hypertension    ? Insomnia    ? Osteoarthritis    ? Stroke (H)     lost taste in left side of mouth             Family History   Problem Relation Age of Onset   ? Ovarian cancer Mother    ? No Medical Problems Father    ? Stroke Sister      Social History     Socioeconomic History   ? Marital status:      Spouse name: Not on file   ? Number of children: Not on file   ? Years of education: Not on file   ? Highest education level: Not on file   Occupational History   ? Not on file   Social Needs   ? Financial resource strain: Not on file   ? Food insecurity     Worry: Not on file     Inability: Not on file   ? Transportation needs      Medical: Not on file     Non-medical: Not on file   Tobacco Use   ? Smoking status: Current Every Day Smoker     Packs/day: 0.25     Years: 70.00     Pack years: 17.50     Types: Cigarettes   ? Smokeless tobacco: Never Used   Substance and Sexual Activity   ? Alcohol use: Not Currently   ? Drug use: Yes     Types: Marijuana     Comment: 1 ounce/ 3 weeks   ? Sexual activity: Not Currently   Lifestyle   ? Physical activity     Days per week: Not on file     Minutes per session: Not on file   ? Stress: Not on file   Relationships   ? Social connections     Talks on phone: Not on file     Gets together: Not on file     Attends [employer] service: Not on file     Active member of club or organization: Not on file     Attends meetings of clubs or organizations: Not on file     Relationship status: Not on file   ? Intimate partner violence     Fear of current or ex partner: Not on file     Emotionally abused: Not on file     Physically abused: Not on file     Forced sexual activity: Not on file   Other Topics Concern   ? Not on file   Social History Narrative    Patient does not use any assistive device for ambulation. Patient is not on any supplemental O2 at home. Patient's wife jared is HCP.          Review of Systems  There have been no reports of fever chills fatigue cough or cold or flulike symptoms nausea vomiting diarrhea dysuria headache stiff neck appetite changes or unusual myalgias.    Current Outpatient Medications   Medication Sig Note   ? acetaminophen (TYLENOL) 500 MG tablet Take 1 tablet (500 mg total) by mouth every 4 (four) hours as needed for pain.    ? amitriptyline (ELAVIL) 25 MG tablet Take 3 tablets (75 mg total) by mouth at bedtime.    ? divalproex (DEPAKOTE SPRINKLE) 125 mg capsule Take 1 capsule (125 mg total) by mouth at bedtime.    ? divalproex (DEPAKOTE SPRINKLE) 125 mg capsule Take 1 capsule (125 mg total) by mouth daily before supper. 9/1/2020: Based on conversation with patient & Jono  medical records, suspect patient is supposed to be taking Depakote two times a day at supper and bedtime, but is only taking 1 capsule (125 mg) nightly at bedtime.   ? finasteride (PROSCAR) 5 mg tablet Take 5 mg by mouth at bedtime.     ? lisinopriL (PRINIVIL,ZESTRIL) 20 MG tablet Take 20 mg by mouth at bedtime.     ? nebivoloL 20 mg Tab Take 20 mg by mouth at bedtime.     ? oxyCODONE (ROXICODONE) 5 MG immediate release tablet Take 0.5 tablets (2.5 mg total) by mouth every 8 (eight) hours as needed.    ? polyethylene glycol (MIRALAX) 17 gram packet Take 1 packet (17 g total) by mouth daily. Hold if loose stool or more than 2 BMs per day    ? terazosin (HYTRIN) 5 MG capsule Take 5 mg by mouth at bedtime.        There were no vitals filed for this visit.  Blood pressure 131/72, pulse 62, respirations 16, temperature 97.8  Physical Exam   Head is normocephalic.   Lung sounds are clear throughout.  Cardiovascular S1-S2 regular rhythm.  No lower extremity edema.  Gastrointestinal nontender and nondistended.  Musculoskeletal denies any hip.  Psychiatric: Flat affect.   Auguste catheter  LABS:   Lab Results   Component Value Date    WBC 7.8 09/04/2020    HGB 11.9 (L) 09/04/2020    HCT 35.1 (L) 09/04/2020    MCV 97 09/04/2020     09/04/2020     Results for orders placed or performed during the hospital encounter of 09/01/20   Basic Metabolic Panel   Result Value Ref Range    Sodium 139 136 - 145 mmol/L    Potassium 4.2 3.5 - 5.0 mmol/L    Chloride 107 98 - 107 mmol/L    CO2 25 22 - 31 mmol/L    Anion Gap, Calculation 7 5 - 18 mmol/L    Glucose 112 70 - 125 mg/dL    Calcium 9.4 8.5 - 10.5 mg/dL    BUN 26 8 - 28 mg/dL    Creatinine 0.98 0.70 - 1.30 mg/dL    GFR MDRD Af Amer >60 >60 mL/min/1.73m2    GFR MDRD Non Af Amer >60 >60 mL/min/1.73m2       Lab Results   Component Value Date    HGBA1C 6.4 (H) 09/01/2020         ASSESSMENT:      ICD-10-CM    1. Hip pain  M25.559    2. Fall, subsequent encounter  W19.XXXD    3.  Hypertension, unspecified type  I10    4. Insomnia, unspecified type  G47.00        PLAN:    At this point did have a meeting with the  regarding his desire to be discharged to home I do not know all the legitimate etiologies or the concerns with that suggestion but the  will check into it today.  I will is no other new medication changes with this visit.        Electronically signed by: Michael Duane Johnson, CNP

## 2021-07-14 PROBLEM — T79.6XXA TRAUMATIC RHABDOMYOLYSIS, INITIAL ENCOUNTER (H): Status: RESOLVED | Noted: 2019-10-24 | Resolved: 2020-09-14

## 2021-09-15 ENCOUNTER — TELEPHONE (OUTPATIENT)
Dept: NEUROPSYCHOLOGY | Facility: CLINIC | Age: 81
End: 2021-09-15

## 2021-09-15 NOTE — TELEPHONE ENCOUNTER
[facility] Call Center    Phone Message    May a detailed message be left on voicemail: yes     Reason for Call: Veronika calling on behalf of patient. She stated they were told patient's 12:30 visit with Dr. Ramsay would be a video visit. She is wondering if it is possible to get this done as a video visit, and if so, can it still be done today. Please call her back as soon as possible.       Action Taken: Message routed to:  [facility] ([facility] neuropsychology    Travel Screening: Not Applicable

## 2021-12-20 ENCOUNTER — OFFICE VISIT (OUTPATIENT)
Dept: NEUROLOGY | Facility: CLINIC | Age: 81
End: 2021-12-20

## 2021-12-20 DIAGNOSIS — F03.91 DEMENTIA WITH BEHAVIORAL DISTURBANCE, UNSPECIFIED DEMENTIA TYPE: Primary | ICD-10-CM

## 2021-12-20 NOTE — LETTER
12/20/2021     RE: Dony Huddleston  [address] [facility] [address]     Dear Colleague,    Thank you for referring your patient, Dony Huddleston, to the [facility] at . Please see a copy of my visit note below.    Patient was seen for neuropsychological evaluation at the request of  Yenni MACIAS, for the purposes of diagnostic clarification and treatment planning.  1 hrs 33 min of test administration and scoring were provided by this writer, Aggie Soliman.  Please see Dr. Ronald Bonilla's report for a full interpretation of the findings.      Adult Neuropsychology Clinic  [facility]      NEUROPSYCHOLOGICAL EVALUATION    RELEVANT HISTORY AND REASON FOR REFERRAL    This is a report of neuropsychological consultation regarding Dony Huddleston (Bill), an 81-year-old, right-handed man with 16 years of formal education. 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 He had a stroke at least 30 years ago, but I am not able to get good details about that today. In our records, there are notes saying his wife had concerns about significant cognitive decline and behavioral changes since well before October 2019, when he was admitted after being found down at home. There were issues with growing memory deficits, anger, aggression, falls, and apparently shuffling gait. The report from brain MRI on 10/25/2019 reads,  1. No evidence of acute intracranial hemorrhage, mass effect, or infarction. 2. 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 3. Chronic lacunar infarcts within the basal ganglia bilaterally. 4. Moderate nonspecific white matter changes. 5. Moderate brain parenchymal volume loss. 6. Paranasal sinus disease involving the  left frontal, ethmoid and maxillary sinuses with an air-fluid level in the left maxillary sinus which could represent acute sinusitis in the appropriate clinical setting.  Cognitive screening was very abnormal (SLUMS 12/30) on 10/27/2019. Cognitive screening was again very abnormal (SLUMS 11/30) on 9/3/2020, which was also during hospitalization after falling at home. In 2020, he was apparently prescribed Elavil and Depakote for behavioral troubles. He had a fall at home on 5/16/2021, resulting in multiple fractures. He has been in skilled nursing care since then, currently at [facility]. Records state he has been agitated and uncooperative, leading to treatment with mirtazapine and quetiapine. Additional medical issues include hypertension, benign prostatic hypertrophy, hyperlipidemia, and type-2 diabetes (A1c 7.7 in May). His current medication list from his SNF includes acetaminophen, 81 mg aspirin, tamsulosin, nebivolol, amlodipine, B12, quetiapine, morphine, sertraline, and furosemide.     In today s interview, Mr. Huddleston tells me he is not noticing any cognitive problems. He is clearly a poor historian. He declines to let me speak to his wife alone while he works on cognitive tests with the psychometrist. He is quick to expresses irritation (and a sense of persecutory ideation emerges) whenever she provides information while we are all together, leading to limited involvement on her side today.     Regarding the stroke 30 years ago, he states there were no lasting sequelae, and he indicates there were no presenting symptoms, either. He says he has not had any falls, and he does not seem to recall the fall in May when his wife reminds him. He clearly does not recall the September 2020 fall when she mentions it. He does not know why he is here or who referred him. This information was provided repeatedly, and he never retained it. He does not know any of his medications. He initially states that he lives  at home, but with a reminder from his wife, he agrees that he is in skilled nursing. Nonetheless, he does not know why he had to go to skilled nursing. He thinks he will be going home soon. His wife says that is impossible, that there is no way for her to meet his physical and medical care needs on her own. He reports no changes from his baseline mental health status and says there is nothing bothering him emotionally. He denies any hallucinatory experiences. He says his sleep is good.     By way of cognitive history, he reports earning an undergraduate degree in English from [email or web address]. 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 He does not know when he retired. His wife thinks it was around 2018.     Per records, family history includes strokes for his father and a sister, and a TIA for his mother.     BEHAVIORAL OBSERVATIONS    Mr. Huddleston had limited cooperation with today s exam. He was irritable and resistant. He repeatedly asked the same questions and showed no recognition of the repetitions. He showed no retention of our answers to his repeated questions. He was reasonably polite and passive at the beginning of the evaluation. By the end, he was upset, yelled at me, and repeatedly berated his wife. He appeared to be very hard of hearing. He did not have hearing aids. A pocket talker amplification device was helpful during the testing session. In testing, he was very resistant. He was slow in making responses. He was disruptive with repetitive statements and questions. He complained of being in pain during the testing session, though he voiced no such complaints during the interview or feedback sessions. He asked to remove his surgical mask (for COVID prevention) repeatedly. He did not notice when it slipped down his face. He was in a wheelchair and stood impulsively at times. He did not notice when his urine collection bag fell to the floor. The test battery had to be truncated. His engagement  was limited, but he nonetheless did fine on some tests. I suspect his low tests are more about the presence of veritable deficits and less about disengagement or intentional withholding of effort. I believe that the test results provide reasonable reflections of his cognitive status.     MEASURES ADMINISTERED    The following measures were administered by a trained psychometrist, under my supervision:    Orientation: Time, Place, Basic Personal Information, Recent [place] Presidents; Wechsler Adult Intelligence Scale-IV: Similarities, Matrix Reasoning; [place] Naming Test; Clock Drawing; Trail Making Test; Espinoza Verbal Learning Test, Revised; Brief Visuospatial Memory Test, Revised.    RESULTS AND INTERPRETATION    Orientation was abnormally low for time (no guess for the date), place (nothing more specific than being in the [place] area), basic personal information (did not know what city he lives in nor his home address), and basic cultural information (gave a not fully accurate name for the current [place] president, could not name any others from the last 40 years).     Abstract verbal analogical reasoning was average. Visual reasoning through pattern identification was average.     Confrontation naming was in the average range for his age.     Clock drawing was abnormal in visuospatial aspects and in the representation of a specified time.     Visual scanning and graphomotor sequencing under simple conditions was below average for speed but had 0 errors. Scanning and sequencing under greater executive demands to control divided attention was so low for both speed and errors that the test had to be discontinued.     Learning a word list over repeated readings was below average. Delayed free recall of the list was exceptionally low, and delayed recognition of the list was below average. Learning a display of simple geometric shapes over repeated viewings was exceptionally low. Delayed free recall of the display was  exceptionally low, and recognition of the shapes was below average.     IMPRESSIONS    In the context of a limited assessment because of reduced cooperation, the neuropsychological test results are abnormal. Mr. Huddleston demonstrates a prominent defect in anterograde memory formation. This is apparent in the test data and in behavioral observation. He demonstrates executive dysfunction and abnormally low orientation. Anosognosia for his impairments is also apparent. General reasoning skills remain in the average ranges on formal measures. It is clear in conversation that his memory deficit, and the lack of appreciation of his deficits, precludes good reasoning and problem solving in practical application. He demonstrates insufficient knowledge about his medical history, current condition, and the reasons for needing to be in skilled nursing. In our records, cognitive decline and such insufficiency of knowledge about his medical needs seems to have been present for more than the last couple of years.    Though I think that at least part of his agitation and frustration observed today is a consequence of neurologic decline impacting his emotional control, I also think there is influence from being very hard of hearing. He clearly misunderstands many statements. An amplifier and headphones are helpful. Poor hearing combines with his cognitive deficits to exacerbate his confusion and shorten his temper. His lack of awareness of cognitive deficits seems to make him think that others around him are not telling him the truth or belittling him. I get a sense of persecutory ideation about his wife, such that he seems to think she is intentionally causing his troubles or undermining him in some way.     A diagnosis of dementia with behavioral disturbance is appropriate. I think chronic cerebrovascular issues are likely at play, but additional degenerative conditions like Alzheimer s disease or limbic-predominant age-related  TDP-43 encephalopathy (LATE) are also possible. I do not see strong indications that this is a case of Lewy body dementia or frontotemporal lobar degeneration. There has been no neuroimaging since 2019, but at that time (which was a time when cognitive changes were already noticeable to family), there were no concerns raised about hydrocephalous.     RECOMMENDATIONS    1. I encourage an evaluation with a neurologist, for an additional view on etiologic considerations, and to look at medication options.   2. His hearing loss should be treated as well as possible.  3. Skilled nursing placement is needed because of his state of dementia, irrespective of the other medical problems requiring daily care and oversight.   4. Mr. Huddleston is unable to demonstrate a sufficient understanding of his condition and medical needs today. He should not be relied upon for independent decisions of consequence. If durable POA is not already in place, guardianship should be pursued.   5. His behavioral changes are understand upsetting to his wife. Hopefully, she can be somewhat buoyed by the notion that his behaviors are an effect of his disease, and they do negate to happier times of prior years. I would strongly encourage her to seek out dementia education and perhaps family/caregiver support groups. The Alzheimer s Association (www.alz.org) is an excellent resource. Social workers or similar staff at Mr. Huddleston s SNF should also be able to point her toward good resources.   6. I or my colleagues would be happy to attempt reevaluation in the years to come, as clinically indicated.      Ronald Bonilla, PhD, LP, ABPP-CN  Board Certified in Clinical Neuropsychology  Licensed Psychologist [id number]      Time spent: One unit psychiatric evaluation including records review, interview, and clinical assessment licensed and board-certified neuropsychologist (CPT 94953). 118 minutes neuropsychological testing evaluation by licensed and  board-certified neuropsychologist, including integration of patient data, interpretation of standardized test results and clinical data, clinical decision-making, treatment planning, report, and interactive feedback to the patient (CPT 89508, 94003). 93 minutes of psychological and neuropsychological test administration and scoring by technician (CPT 75995, 93522). Diagnoses: F03.91    Again, thank you for allowing me to participate in the care of your patient.      Sincerely,  Ronald Bonilla, PhD

## 2021-12-20 NOTE — PROGRESS NOTES
Adult Neuropsychology Clinic  [facility]      NEUROPSYCHOLOGICAL EVALUATION    RELEVANT HISTORY AND REASON FOR REFERRAL    This is a report of neuropsychological consultation regarding Dony Huddleston (Bill), an 81-year-old, right-handed man with 16 years of formal education. He comes to me with a diagnosis of idiopathic dementia, and he was referred for diagnostic clarification and aid in treatment planning by GILBERTO Street, of [facility]. He had a stroke at least 30 years ago, but I am not able to get good details about that today. In our records, there are notes saying his wife had concerns about significant cognitive decline and behavioral changes since well before October 2019, when he was admitted after being found down at home. There were issues with growing memory deficits, anger, aggression, falls, and apparently shuffling gait. The report from brain MRI on 10/25/2019 reads,  1. No evidence of acute intracranial hemorrhage, mass effect, or infarction. 2. 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 3. Chronic lacunar infarcts within the basal ganglia bilaterally. 4. Moderate nonspecific white matter changes. 5. Moderate brain parenchymal volume loss. 6. Paranasal sinus disease involving the left frontal, ethmoid and maxillary sinuses with an air-fluid level in the left maxillary sinus which could represent acute sinusitis in the appropriate clinical setting.  Cognitive screening was very abnormal (SLUMS 12/30) on 10/27/2019. Cognitive screening was again very abnormal (SLUMS 11/30) on 9/3/2020, which was also during hospitalization after falling at home. In 2020, he was apparently prescribed Elavil and Depakote for behavioral troubles. He had a fall at home on 5/16/2021, resulting in multiple fractures. He has been in skilled nursing care since then, currently at [facility]. Records state he has been agitated  and uncooperative, leading to treatment with mirtazapine and quetiapine. Additional medical issues include hypertension, benign prostatic hypertrophy, hyperlipidemia, and type-2 diabetes (A1c 7.7 in May). His current medication list from his SNF includes acetaminophen, 81 mg aspirin, tamsulosin, nebivolol, amlodipine, B12, quetiapine, morphine, sertraline, and furosemide.     In today s interview, Mr. Huddleston tells me he is not noticing any cognitive problems. He is clearly a poor historian. He declines to let me speak to his wife alone while he works on cognitive tests with the psychometrist. He is quick to expresses irritation (and a sense of persecutory ideation emerges) whenever she provides information while we are all together, leading to limited involvement on her side today.     Regarding the stroke 30 years ago, he states there were no lasting sequelae, and he indicates there were no presenting symptoms, either. He says he has not had any falls, and he does not seem to recall the fall in May when his wife reminds him. He clearly does not recall the September 2020 fall when she mentions it. He does not know why he is here or who referred him. This information was provided repeatedly, and he never retained it. He does not know any of his medications. He initially states that he lives at home, but with a reminder from his wife, he agrees that he is in skilled nursing. Nonetheless, he does not know why he had to go to skilled nursing. He thinks he will be going home soon. His wife says that is impossible, that there is no way for her to meet his physical and medical care needs on her own. He reports no changes from his baseline mental health status and says there is nothing bothering him emotionally. He denies any hallucinatory experiences. He says his sleep is good.     By way of cognitive history, he reports earning an undergraduate degree in English from [employer]. 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 His wife thinks it was around 2018.     Per records, family history includes strokes for his father and a sister, and a TIA for his mother.     BEHAVIORAL OBSERVATIONS    Mr. Huddleston had limited cooperation with today s exam. He was irritable and resistant. He repeatedly asked the same questions and showed no recognition of the repetitions. He showed no retention of our answers to his repeated questions. He was reasonably polite and passive at the beginning of the evaluation. By the end, he was upset, yelled at me, and repeatedly berated his wife. He appeared to be very hard of hearing. He did not have hearing aids. A pocket talker amplification device was helpful during the testing session. In testing, he was very resistant. He was slow in making responses. He was disruptive with repetitive statements and questions. He complained of being in pain during the testing session, though he voiced no such complaints during the interview or feedback sessions. He asked to remove his surgical mask (for COVID prevention) repeatedly. He did not notice when it slipped down his face. He was in a wheelchair and stood impulsively at times. He did not notice when his urine collection bag fell to the floor. The test battery had to be truncated. His engagement was limited, but he nonetheless did fine on some tests. I suspect his low tests are more about the presence of veritable deficits and less about disengagement or intentional withholding of effort. I believe that the test results provide reasonable reflections of his cognitive status.     MEASURES ADMINISTERED    The following measures were administered by a trained psychometrist, under my supervision:    Orientation: Time, Place, Basic Personal Information, Recent [place] Presidents; Wechsler Adult Intelligence Scale-IV: Similarities, Matrix Reasoning; [place] Naming Test; Clock Drawing; Trail Making Test; Espinoza Verbal Learning  Test, Revised; Brief Visuospatial Memory Test, Revised.    RESULTS AND INTERPRETATION    Orientation was abnormally low for time (no guess for the date), place (nothing more specific than being in the [place] area), basic personal information (did not know what city he lives in nor his home address), and basic cultural information (gave a not fully accurate name for the current [place] president, could not name any others from the last 40 years).     Abstract verbal analogical reasoning was average. Visual reasoning through pattern identification was average.     Confrontation naming was in the average range for his age.     Clock drawing was abnormal in visuospatial aspects and in the representation of a specified time.     Visual scanning and graphomotor sequencing under simple conditions was below average for speed but had 0 errors. Scanning and sequencing under greater executive demands to control divided attention was so low for both speed and errors that the test had to be discontinued.     Learning a word list over repeated readings was below average. Delayed free recall of the list was exceptionally low, and delayed recognition of the list was below average. Learning a display of simple geometric shapes over repeated viewings was exceptionally low. Delayed free recall of the display was exceptionally low, and recognition of the shapes was below average.     IMPRESSIONS    In the context of a limited assessment because of reduced cooperation, the neuropsychological test results are abnormal. Mr. Huddleston demonstrates a prominent defect in anterograde memory formation. This is apparent in the test data and in behavioral observation. He demonstrates executive dysfunction and abnormally low orientation. Anosognosia for his impairments is also apparent. General reasoning skills remain in the average ranges on formal measures. It is clear in conversation that his memory deficit, and the lack of appreciation of his  deficits, precludes good reasoning and problem solving in practical application. He demonstrates insufficient knowledge about his medical history, current condition, and the reasons for needing to be in skilled nursing. In our records, cognitive decline and such insufficiency of knowledge about his medical needs seems to have been present for more than the last couple of years.    Though I think that at least part of his agitation and frustration observed today is a consequence of neurologic decline impacting his emotional control, I also think there is influence from being very hard of hearing. He clearly misunderstands many statements. An amplifier and headphones are helpful. Poor hearing combines with his cognitive deficits to exacerbate his confusion and shorten his temper. His lack of awareness of cognitive deficits seems to make him think that others around him are not telling him the truth or belittling him. I get a sense of persecutory ideation about his wife, such that he seems to think she is intentionally causing his troubles or undermining him in some way.     A diagnosis of dementia with behavioral disturbance is appropriate. I think chronic cerebrovascular issues are likely at play, but additional degenerative conditions like Alzheimer s disease or limbic-predominant age-related TDP-43 encephalopathy (LATE) are also possible. I do not see strong indications that this is a case of Lewy body dementia or frontotemporal lobar degeneration. There has been no neuroimaging since 2019, but at that time (which was a time when cognitive changes were already noticeable to family), there were no concerns raised about hydrocephalous.     RECOMMENDATIONS    1. I encourage an evaluation with a neurologist, for an additional view on etiologic considerations, and to look at medication options.   2. His hearing loss should be treated as well as possible.  3. Skilled nursing placement is needed because of his state of  dementia, irrespective of the other medical problems requiring daily care and oversight.   4. Mr. Huddleston is unable to demonstrate a sufficient understanding of his condition and medical needs today. He should not be relied upon for independent decisions of consequence. If durable POA is not already in place, guardianship should be pursued.   5. His behavioral changes are understand upsetting to his wife. Hopefully, she can be somewhat buoyed by the notion that his behaviors are an effect of his disease, and they do negate to happier times of prior years. I would strongly encourage her to seek out dementia education and perhaps family/caregiver support groups. The Alzheimer s Association (www.alz.org) is an excellent resource. Social workers or similar staff at Mr. Huddleston s SNF should also be able to point her toward good resources.   6. I or my colleagues would be happy to attempt reevaluation in the years to come, as clinically indicated.      Ronald Bonilla, PhD, LP, ABPP-CN  Board Certified in Clinical Neuropsychology  Licensed Psychologist [id number]      Time spent: One unit psychiatric evaluation including records review, interview, and clinical assessment licensed and board-certified neuropsychologist (CPT 11412). 118 minutes neuropsychological testing evaluation by licensed and board-certified neuropsychologist, including integration of patient data, interpretation of standardized test results and clinical data, clinical decision-making, treatment planning, report, and interactive feedback to the patient (CPT 41437, 30502). 93 minutes of psychological and neuropsychological test administration and scoring by technician (CPT 12938, 86777). Diagnoses: F03.91

## 2021-12-20 NOTE — PROGRESS NOTES
Name: Dony Huddleston MRN: [id number]  : 1940  RAINEY: 2021  Staff: WILL Tech: CHEYANNE Age: 81  Sex: Male Hand: Right Educ: 16    ORIENTATION     Time  -15     Place  0 /2     Personal info     2 /4     Presidents 0 /    WAIS-IV   Raw SSa     Similarities  18 8     Matrix Reasoning 11 11       [place] NAMING TEST     Raw: 50  SS: 10 %ile: 41-59    CLOCK DRAWING: Impaired    TRAILS Raw  Err SS %ile     A 85  0 5 3-5     B   2 2 <1    HVLT-R Form 4     Trial 1 2 3      2 4 4  Raw T     Total Learning (1-3) 10 25     Delayed Recall  0 23     Percent Retention 0 <20     Recognition Hits/FP 10/3 27    BVMT-R Form 1     Trial 1 2 3      1 0 1  Raw M (SD)     Total Learning (1-3) 2 14.8 (6.0)     Delayed Recall  1 6.5 (2.8)     Percent Retention 100      Recognition Hits/FP 4/0 5.5 (0.7)

## 2021-12-20 NOTE — LETTER
12/20/2021     RE: Dony Huddleston  [address] [address]     Dear Colleague,    Thank you for referring your patient, Dony Huddleston, to the [facility] at [facility]. Please see a copy of my visit note below.    Patient was seen for neuropsychological evaluation at the request of  Yenni MACIAS, for the purposes of diagnostic clarification and treatment planning.  1 hrs 33 min of test administration and scoring were provided by this writer, Aggie Soliman.  Please see Dr. Ronald Bonilla's report for a full interpretation of the findings.      Adult Neuropsychology Clinic  [facility]      NEUROPSYCHOLOGICAL EVALUATION    RELEVANT HISTORY AND REASON FOR REFERRAL    This is a report of neuropsychological consultation regarding Dony Huddleston (Bill), an 81-year-old, right-handed man with 16 years of formal education. 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 He had a stroke at least 30 years ago, but I am not able to get good details about that today. In our records, there are notes saying his wife had concerns about significant cognitive decline and behavioral changes since well before October 2019, when he was admitted after being found down at home. There were issues with growing memory deficits, anger, aggression, falls, and apparently shuffling gait. The report from brain MRI on 10/25/2019 reads,  1. No evidence of acute intracranial hemorrhage, mass effect, or infarction. 2. 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 3. Chronic lacunar infarcts within the basal ganglia bilaterally. 4. Moderate nonspecific white matter changes. 5. Moderate brain parenchymal volume loss. 6. Paranasal sinus disease involving the  left frontal, ethmoid and maxillary sinuses with an air-fluid level in the left maxillary sinus which could represent acute sinusitis in the appropriate clinical setting.  Cognitive screening was very abnormal (SLUMS 12/30) on 10/27/2019. Cognitive screening was again very abnormal (SLUMS 11/30) on 9/3/2020, which was also during hospitalization after falling at home. In 2020, he was apparently prescribed Elavil and Depakote for behavioral troubles. He had a fall at home on 5/16/2021, resulting in multiple fractures. He has been in skilled nursing care since then, currently at [facility]. Records state he has been agitated and uncooperative, leading to treatment with mirtazapine and quetiapine. Additional medical issues include hypertension, benign prostatic hypertrophy, hyperlipidemia, and type-2 diabetes (A1c 7.7 in May). His current medication list from his SNF includes acetaminophen, 81 mg aspirin, tamsulosin, nebivolol, amlodipine, B12, quetiapine, morphine, sertraline, and furosemide.     In today s interview, Mr. Huddleston tells me he is not noticing any cognitive problems. He is clearly a poor historian. He declines to let me speak to his wife alone while he works on cognitive tests with the psychometrist. He is quick to expresses irritation (and a sense of persecutory ideation emerges) whenever she provides information while we are all together, leading to limited involvement on her side today.     Regarding the stroke 30 years ago, he states there were no lasting sequelae, and he indicates there were no presenting symptoms, either. He says he has not had any falls, and he does not seem to recall the fall in May when his wife reminds him. He clearly does not recall the September 2020 fall when she mentions it. He does not know why he is here or who referred him. This information was provided repeatedly, and he never retained it. He does not know any of his medications. He initially states that he lives  at home, but with a reminder from his wife, he agrees that he is in skilled nursing. Nonetheless, he does not know why he had to go to skilled nursing. He thinks he will be going home soon. His wife says that is impossible, that there is no way for her to meet his physical and medical care needs on her own. He reports no changes from his baseline mental health status and says there is nothing bothering him emotionally. He denies any hallucinatory experiences. He says his sleep is good.     By way of cognitive history, he reports earning an undergraduate degree in English from [employer]. 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 He does not know when he retired. His wife thinks it was around 2018.     Per records, family history includes strokes for his father and a sister, and a TIA for his mother.     BEHAVIORAL OBSERVATIONS    Mr. Huddleston had limited cooperation with today s exam. He was irritable and resistant. He repeatedly asked the same questions and showed no recognition of the repetitions. He showed no retention of our answers to his repeated questions. He was reasonably polite and passive at the beginning of the evaluation. By the end, he was upset, yelled at me, and repeatedly berated his wife. He appeared to be very hard of hearing. He did not have hearing aids. A pocket talker amplification device was helpful during the testing session. In testing, he was very resistant. He was slow in making responses. He was disruptive with repetitive statements and questions. He complained of being in pain during the testing session, though he voiced no such complaints during the interview or feedback sessions. He asked to remove his surgical mask (for COVID prevention) repeatedly. He did not notice when it slipped down his face. He was in a wheelchair and stood impulsively at times. He did not notice when his urine collection bag fell to the floor. The test battery had to be truncated. His engagement  was limited, but he nonetheless did fine on some tests. I suspect his low tests are more about the presence of veritable deficits and less about disengagement or intentional withholding of effort. I believe that the test results provide reasonable reflections of his cognitive status.     MEASURES ADMINISTERED    The following measures were administered by a trained psychometrist, under my supervision:    Orientation: Time, Place, Basic Personal Information, Recent [place] Presidents; Wechsler Adult Intelligence Scale-IV: Similarities, Matrix Reasoning; [place] Naming Test; Clock Drawing; Trail Making Test; Espinoza Verbal Learning Test, Revised; Brief Visuospatial Memory Test, Revised.    RESULTS AND INTERPRETATION    Orientation was abnormally low for time (no guess for the date), place (nothing more specific than being in the [place] area), basic personal information (did not know what city he lives in nor his home address), and basic cultural information (gave a not fully accurate name for the current [place] president, could not name any others from the last 40 years).     Abstract verbal analogical reasoning was average. Visual reasoning through pattern identification was average.     Confrontation naming was in the average range for his age.     Clock drawing was abnormal in visuospatial aspects and in the representation of a specified time.     Visual scanning and graphomotor sequencing under simple conditions was below average for speed but had 0 errors. Scanning and sequencing under greater executive demands to control divided attention was so low for both speed and errors that the test had to be discontinued.     Learning a word list over repeated readings was below average. Delayed free recall of the list was exceptionally low, and delayed recognition of the list was below average. Learning a display of simple geometric shapes over repeated viewings was exceptionally low. Delayed free recall of the display was  exceptionally low, and recognition of the shapes was below average.     IMPRESSIONS    In the context of a limited assessment because of reduced cooperation, the neuropsychological test results are abnormal. Mr. Huddleston demonstrates a prominent defect in anterograde memory formation. This is apparent in the test data and in behavioral observation. He demonstrates executive dysfunction and abnormally low orientation. Anosognosia for his impairments is also apparent. General reasoning skills remain in the average ranges on formal measures. It is clear in conversation that his memory deficit, and the lack of appreciation of his deficits, precludes good reasoning and problem solving in practical application. He demonstrates insufficient knowledge about his medical history, current condition, and the reasons for needing to be in skilled nursing. In our records, cognitive decline and such insufficiency of knowledge about his medical needs seems to have been present for more than the last couple of years.    Though I think that at least part of his agitation and frustration observed today is a consequence of neurologic decline impacting his emotional control, I also think there is influence from being very hard of hearing. He clearly misunderstands many statements. An amplifier and headphones are helpful. Poor hearing combines with his cognitive deficits to exacerbate his confusion and shorten his temper. His lack of awareness of cognitive deficits seems to make him think that others around him are not telling him the truth or belittling him. I get a sense of persecutory ideation about his wife, such that he seems to think she is intentionally causing his troubles or undermining him in some way.     A diagnosis of dementia with behavioral disturbance is appropriate. I think chronic cerebrovascular issues are likely at play, but additional degenerative conditions like Alzheimer s disease or limbic-predominant age-related  TDP-43 encephalopathy (LATE) are also possible. I do not see strong indications that this is a case of Lewy body dementia or frontotemporal lobar degeneration. There has been no neuroimaging since 2019, but at that time (which was a time when cognitive changes were already noticeable to family), there were no concerns raised about hydrocephalous.     RECOMMENDATIONS    1. I encourage an evaluation with a neurologist, for an additional view on etiologic considerations, and to look at medication options.   2. His hearing loss should be treated as well as possible.  3. Skilled nursing placement is needed because of his state of dementia, irrespective of the other medical problems requiring daily care and oversight.   4. Mr. Huddleston is unable to demonstrate a sufficient understanding of his condition and medical needs today. He should not be relied upon for independent decisions of consequence. If durable POA is not already in place, guardianship should be pursued.   5. His behavioral changes are understand upsetting to his wife. Hopefully, she can be somewhat buoyed by the notion that his behaviors are an effect of his disease, and they do negate to happier times of prior years. I would strongly encourage her to seek out dementia education and perhaps family/caregiver support groups. The Alzheimer s Association (www.alz.org) is an excellent resource. Social workers or similar staff at Mr. Huddleston s SNF should also be able to point her toward good resources.   6. I or my colleagues would be happy to attempt reevaluation in the years to come, as clinically indicated.      Ronald Bonilla, PhD, LP, ABPP-CN  Board Certified in Clinical Neuropsychology  Licensed Psychologist [id number]      Time spent: One unit psychiatric evaluation including records review, interview, and clinical assessment licensed and board-certified neuropsychologist (CPT 06763). 118 minutes neuropsychological testing evaluation by licensed and  board-certified neuropsychologist, including integration of patient data, interpretation of standardized test results and clinical data, clinical decision-making, treatment planning, report, and interactive feedback to the patient (CPT 70852, 47315). 93 minutes of psychological and neuropsychological test administration and scoring by technician (CPT 39877, 95980). Diagnoses: F03.91

## 2021-12-20 NOTE — PROGRESS NOTES
Patient was seen for neuropsychological evaluation at the request of  Yenni MACIAS, for the purposes of diagnostic clarification and treatment planning.  1 hrs 33 min of test administration and scoring were provided by this writer, Aggie Soliman.  Please see Dr. Ronald Bonilla's report for a full interpretation of the findings.